# Patient Record
Sex: MALE | Race: BLACK OR AFRICAN AMERICAN | NOT HISPANIC OR LATINO | Employment: FULL TIME | URBAN - METROPOLITAN AREA
[De-identification: names, ages, dates, MRNs, and addresses within clinical notes are randomized per-mention and may not be internally consistent; named-entity substitution may affect disease eponyms.]

---

## 2024-06-25 ENCOUNTER — OFFICE VISIT (OUTPATIENT)
Dept: FAMILY MEDICINE CLINIC | Facility: CLINIC | Age: 29
End: 2024-06-25
Payer: COMMERCIAL

## 2024-06-25 ENCOUNTER — PATIENT ROUNDING (BHMG ONLY) (OUTPATIENT)
Dept: FAMILY MEDICINE CLINIC | Facility: CLINIC | Age: 29
End: 2024-06-25

## 2024-06-25 VITALS
RESPIRATION RATE: 16 BRPM | HEART RATE: 81 BPM | BODY MASS INDEX: 17.21 KG/M2 | OXYGEN SATURATION: 97 % | SYSTOLIC BLOOD PRESSURE: 130 MMHG | TEMPERATURE: 97.7 F | HEIGHT: 70 IN | WEIGHT: 120.2 LBS | DIASTOLIC BLOOD PRESSURE: 72 MMHG

## 2024-06-25 DIAGNOSIS — Z11.3 SCREEN FOR STD (SEXUALLY TRANSMITTED DISEASE): ICD-10-CM

## 2024-06-25 DIAGNOSIS — B36.0 TINEA VERSICOLOR: ICD-10-CM

## 2024-06-25 DIAGNOSIS — Z76.89 ENCOUNTER TO ESTABLISH CARE: Primary | ICD-10-CM

## 2024-06-25 DIAGNOSIS — Z83.3 FAMILY HISTORY OF DIABETES MELLITUS: ICD-10-CM

## 2024-06-25 DIAGNOSIS — Z11.59 NEED FOR HEPATITIS C SCREENING TEST: ICD-10-CM

## 2024-06-25 DIAGNOSIS — Z00.00 ENCOUNTER FOR PREVENTIVE HEALTH EXAMINATION: ICD-10-CM

## 2024-06-25 DIAGNOSIS — Z13.220 LIPID SCREENING: ICD-10-CM

## 2024-06-25 DIAGNOSIS — L29.9 PRURITUS: ICD-10-CM

## 2024-06-25 DIAGNOSIS — E66.01 CLASS 3 SEVERE OBESITY WITHOUT SERIOUS COMORBIDITY WITH BODY MASS INDEX (BMI) OF 50.0 TO 59.9 IN ADULT, UNSPECIFIED OBESITY TYPE: ICD-10-CM

## 2024-06-25 PROBLEM — E66.813 CLASS 3 SEVERE OBESITY WITHOUT SERIOUS COMORBIDITY WITH BODY MASS INDEX (BMI) OF 50.0 TO 59.9 IN ADULT: Status: ACTIVE | Noted: 2024-06-25

## 2024-06-25 PROCEDURE — 99203 OFFICE O/P NEW LOW 30 MIN: CPT | Performed by: STUDENT IN AN ORGANIZED HEALTH CARE EDUCATION/TRAINING PROGRAM

## 2024-06-25 RX ORDER — HYDROXYZINE HYDROCHLORIDE 25 MG/1
50 TABLET, FILM COATED ORAL NIGHTLY PRN
Qty: 60 TABLET | Refills: 1 | Status: SHIPPED | OUTPATIENT
Start: 2024-06-25 | End: 2024-08-24

## 2024-06-25 RX ORDER — KETOCONAZOLE 20 MG/ML
SHAMPOO TOPICAL 2 TIMES WEEKLY
Qty: 120 ML | Refills: 1 | Status: SHIPPED | OUTPATIENT
Start: 2024-06-27

## 2024-06-25 NOTE — PROGRESS NOTES
"Chief Complaint  Establish Care (Broke out over the weekend with eczema/ dry patches on stomach and back and arms)    Subjective        Charles Granger presents to [facility]  History of Present Illness  27yo AAM Patient was previously seen by PCP at [facility] clinic, however patient is new to me and is here for establishment of care visit.    Patient denies any past medical history and reports he does not take any medications currently.    Pt c/o skin breakdown that started 3 weeks ago on his periumbilical area and then to the back and then on the arms nicky b/l elbows. Pt reports the rash is itchy.    Pt reports he is matson and desires STD testing as he was never tested below.  Patient reports family history of diabetes.      Objective   Vital Signs:  /72 (BP Location: Left arm, Patient Position: Sitting, Cuff Size: Adult)   Pulse 81   Temp 97.7 °F (36.5 °C) (Temporal)   Resp 16   Ht 177 cm (69.69\")   Wt 54.5 kg (120 lb 3.2 oz)   SpO2 97%   BMI 17.40 kg/m²   Estimated body mass index is 17.4 kg/m² as calculated from the following:    Height as of this encounter: 177 cm (69.69\").    Weight as of this encounter: 54.5 kg (120 lb 3.2 oz).       BMI is below normal parameters (malnutrition). Recommendations: referral to dietitian      Physical Exam  Constitutional:       Appearance: Normal appearance. He is obese.   HENT:      Head: Normocephalic and atraumatic.   Eyes:      Conjunctiva/sclera: Conjunctivae normal.   Cardiovascular:      Rate and Rhythm: Normal rate and regular rhythm.      Heart sounds: Normal heart sounds.   Pulmonary:      Effort: Pulmonary effort is normal.      Breath sounds: Normal breath sounds.   Abdominal:      General: Bowel sounds are normal.      Palpations: Abdomen is soft.      Comments: Non-tender   Skin:     General: Skin is warm.      Comments: Tinea veriscolor rash on back, periumbilical area and elbows and some on " upper ext.   Neurological:      General: No focal deficit present.      Mental Status: He is alert and oriented to person, place, and time.   Psychiatric:         Mood and Affect: Mood normal.         Behavior: Behavior normal.        Result Review :    The following data was reviewed by: ALCON Hernandez on 06/25/2024:                             Assessment and Plan     Diagnoses and all orders for this visit:    1. Encounter to establish care (Primary)    2. Tinea versicolor  -     ketoconazole (NIZORAL) 2 % shampoo; Apply topically to the appropriate area as directed 2 (Two) Times a Week. Apply to affected areas and wash off after five minutes for three consecutive days.  Dispense: 120 mL; Refill: 1    3. Screen for STD (sexually transmitted disease)  Assessment & Plan:  Instructed patient to inform sex partners if patient is diagnosed with STD. Also to inform sex partner(s) and to go to their healthcare provider to be screened and treated for possible STD exposure. Also recommend abstinence until completion of therapy if diagnosed with STD.    Counseled patient on safe sex practices instructed patient to use use condoms as appropriate and avoid high risk sex partners.  Also educated patient to avoid mixing sex with drugs and alcohol.      Orders:  -     Hepatitis C Antibody  -     Chlamydia trachomatis, Neisseria gonorrhoeae, Trichomonas vaginalis, PCR - Swab, Urine, Clean Catch  -     HSV 2 Antibody, IgG  -     HSV 1 Antibody, IgG  -     RPR, Rfx Qn RPR / Confirm TP  -     Urinalysis With Microscopic - Urine, Clean Catch  -     HIV-1 / O / 2 Ag / Antibody    4. Pruritus  -     hydrOXYzine (ATARAX) 25 MG tablet; Take 2 tablets by mouth At Night As Needed for Itching, Allergies or Anxiety for up to 60 days.  Dispense: 60 tablet; Refill: 1    5. Family history of diabetes mellitus  -     Hemoglobin A1c    6. Need for hepatitis C screening test  -     Hepatitis C Antibody    7. Encounter for preventive  health examination  -     CBC & Differential  -     Comprehensive Metabolic Panel    8. Lipid screening  -     Lipid Panel    9. Class 3 severe obesity without serious comorbidity with body mass index (BMI) of 50.0 to 59.9 in adult, unspecified obesity type  Assessment & Plan:  Patient's (Body mass index is 17.4 kg/m².) indicates that they are morbidly/severely obese (BMI > 40 or > 35 with obesity - related health condition) with health conditions that include none . Weight is newly identified. BMI  is above average; BMI management plan is completed. We discussed portion control and increasing exercise.     Discussed the importance of healthy diet, nutrition, and lifestyle. Recommend low salt, fat/cholesterol diet and avoid concentrated sweets. Encouraged DASH diet along with fresh fruits & vegetables and low fat dairy products. Counseled patient to exercise/walk as tolerated. Avoid tobacco and alcohol use.      Orders:  -     Ambulatory Referral to Nutrition Services      Discussed morbid obesity and the complications of obesity over time and the need to lose weight.  Patient reports he has been losing weight.  All chronic conditions have been addressed and treated by the practice or other specialists. Medications have been reconciled and refilled as appropriate. Reiterated compliance and timely follow up appointments. Side effects of all new and old medications reviewed with the patient and patient willing to accept all risks involved. Advised RTO if no improvement or worsening of symptoms or if any new complaints arise. Patient advised to follow up with clinic or call after diagnostic tests, if patient does not hear from office 3 days after the test completion.          Follow Up     Return in about 4 weeks (around 7/23/2024) for Next scheduled follow up, Annual physical.  Patient was given instructions and counseling regarding his condition or for health maintenance advice. Please see specific information pulled  into the AVS if appropriate.

## 2024-06-25 NOTE — ASSESSMENT & PLAN NOTE
Patient's (Body mass index is 17.4 kg/m².) indicates that they are morbidly/severely obese (BMI > 40 or > 35 with obesity - related health condition) with health conditions that include none . Weight is newly identified. BMI  is above average; BMI management plan is completed. We discussed portion control and increasing exercise.     Discussed the importance of healthy diet, nutrition, and lifestyle. Recommend low salt, fat/cholesterol diet and avoid concentrated sweets. Encouraged DASH diet along with fresh fruits & vegetables and low fat dairy products. Counseled patient to exercise/walk as tolerated. Avoid tobacco and alcohol use.

## 2024-06-25 NOTE — ASSESSMENT & PLAN NOTE
Instructed patient to inform sex partners if patient is diagnosed with STD. Also to inform sex partner(s) and to go to their healthcare provider to be screened and treated for possible STD exposure. Also recommend abstinence until completion of therapy if diagnosed with STD.    Counseled patient on safe sex practices instructed patient to use use condoms as appropriate and avoid high risk sex partners.  Also educated patient to avoid mixing sex with drugs and alcohol.

## 2024-06-27 LAB
ALBUMIN SERPL-MCNC: 4.4 G/DL (ref 4.3–5.2)
ALP SERPL-CCNC: 61 IU/L (ref 44–121)
ALT SERPL-CCNC: 31 IU/L (ref 0–44)
APPEARANCE UR: CLEAR
AST SERPL-CCNC: 25 IU/L (ref 0–40)
BACTERIA #/AREA URNS HPF: NORMAL /[HPF]
BASOPHILS # BLD AUTO: 0 X10E3/UL (ref 0–0.2)
BASOPHILS NFR BLD AUTO: 0 %
BILIRUB SERPL-MCNC: 0.6 MG/DL (ref 0–1.2)
BILIRUB UR QL STRIP: NEGATIVE
BUN SERPL-MCNC: 8 MG/DL (ref 6–20)
BUN/CREAT SERPL: 8 (ref 9–20)
C TRACH RRNA SPEC QL NAA+PROBE: NEGATIVE
CALCIUM SERPL-MCNC: 9.6 MG/DL (ref 8.7–10.2)
CASTS URNS QL MICRO: NORMAL /LPF
CHLORIDE SERPL-SCNC: 102 MMOL/L (ref 96–106)
CHOLEST SERPL-MCNC: 206 MG/DL (ref 100–199)
CO2 SERPL-SCNC: 24 MMOL/L (ref 20–29)
COLOR UR: YELLOW
CREAT SERPL-MCNC: 0.96 MG/DL (ref 0.76–1.27)
EGFRCR SERPLBLD CKD-EPI 2021: 111 ML/MIN/1.73
EOSINOPHIL # BLD AUTO: 0 X10E3/UL (ref 0–0.4)
EOSINOPHIL NFR BLD AUTO: 0 %
EPI CELLS #/AREA URNS HPF: NORMAL /HPF (ref 0–10)
ERYTHROCYTE [DISTWIDTH] IN BLOOD BY AUTOMATED COUNT: 12.7 % (ref 11.6–15.4)
GLOBULIN SER CALC-MCNC: 3.1 G/DL (ref 1.5–4.5)
GLUCOSE SERPL-MCNC: 99 MG/DL (ref 70–99)
GLUCOSE UR QL STRIP: NEGATIVE
HBA1C MFR BLD: 5.5 % (ref 4.8–5.6)
HCT VFR BLD AUTO: 51.4 % (ref 37.5–51)
HCV IGG SERPL QL IA: NON REACTIVE
HDLC SERPL-MCNC: 71 MG/DL
HGB BLD-MCNC: 17.2 G/DL (ref 13–17.7)
HGB UR QL STRIP: NEGATIVE
HIV 1+2 AB+HIV1 P24 AG SERPL QL IA: NON REACTIVE
HSV1 IGG SER IA-ACNC: <0.91 INDEX (ref 0–0.9)
HSV2 IGG SER IA-ACNC: 13.8 INDEX (ref 0–0.9)
IMM GRANULOCYTES # BLD AUTO: 0 X10E3/UL (ref 0–0.1)
IMM GRANULOCYTES NFR BLD AUTO: 0 %
KETONES UR QL STRIP: NEGATIVE
LDLC SERPL CALC-MCNC: 114 MG/DL (ref 0–99)
LEUKOCYTE ESTERASE UR QL STRIP: NEGATIVE
LYMPHOCYTES # BLD AUTO: 2.1 X10E3/UL (ref 0.7–3.1)
LYMPHOCYTES NFR BLD AUTO: 51 %
MCH RBC QN AUTO: 33.3 PG (ref 26.6–33)
MCHC RBC AUTO-ENTMCNC: 33.5 G/DL (ref 31.5–35.7)
MCV RBC AUTO: 99 FL (ref 79–97)
MICRO URNS: NORMAL
MICRO URNS: NORMAL
MONOCYTES # BLD AUTO: 0.3 X10E3/UL (ref 0.1–0.9)
MONOCYTES NFR BLD AUTO: 8 %
N GONORRHOEA RRNA SPEC QL NAA+PROBE: NEGATIVE
NEUTROPHILS # BLD AUTO: 1.7 X10E3/UL (ref 1.4–7)
NEUTROPHILS NFR BLD AUTO: 41 %
NITRITE UR QL STRIP: NEGATIVE
PH UR STRIP: 6 [PH] (ref 5–7.5)
PLATELET # BLD AUTO: 304 X10E3/UL (ref 150–450)
POTASSIUM SERPL-SCNC: 4.3 MMOL/L (ref 3.5–5.2)
PROT SERPL-MCNC: 7.5 G/DL (ref 6–8.5)
PROT UR QL STRIP: NORMAL
RBC # BLD AUTO: 5.17 X10E6/UL (ref 4.14–5.8)
RBC #/AREA URNS HPF: NORMAL /HPF (ref 0–2)
RPR SER QL: NON REACTIVE
SODIUM SERPL-SCNC: 140 MMOL/L (ref 134–144)
SP GR UR STRIP: 1.02 (ref 1–1.03)
T VAGINALIS RRNA SPEC QL NAA+PROBE: NEGATIVE
TRIGL SERPL-MCNC: 119 MG/DL (ref 0–149)
UROBILINOGEN UR STRIP-MCNC: 0.2 MG/DL (ref 0.2–1)
VLDLC SERPL CALC-MCNC: 21 MG/DL (ref 5–40)
WBC # BLD AUTO: 4.1 X10E3/UL (ref 3.4–10.8)
WBC #/AREA URNS HPF: NORMAL /HPF (ref 0–5)

## 2024-06-28 DIAGNOSIS — B00.9 PCR POSITIVE FOR HERPES SIMPLEX VIRUS TYPE 2 (HSV-2) DNA: Primary | ICD-10-CM

## 2024-06-28 RX ORDER — ACYCLOVIR 400 MG/1
400 TABLET ORAL 3 TIMES DAILY
Qty: 21 TABLET | Refills: 0 | Status: SHIPPED | OUTPATIENT
Start: 2024-06-28 | End: 2024-07-05

## 2024-08-02 ENCOUNTER — TELEMEDICINE (OUTPATIENT)
Dept: FAMILY MEDICINE CLINIC | Facility: TELEHEALTH | Age: 29
End: 2024-08-02
Payer: COMMERCIAL

## 2024-08-02 DIAGNOSIS — K04.7 DENTAL ABSCESS: Primary | ICD-10-CM

## 2024-08-02 RX ORDER — AMOXICILLIN AND CLAVULANATE POTASSIUM 875; 125 MG/1; MG/1
1 TABLET, FILM COATED ORAL 2 TIMES DAILY
Qty: 20 TABLET | Refills: 0 | Status: SHIPPED | OUTPATIENT
Start: 2024-08-02 | End: 2024-08-12

## 2024-08-02 RX ORDER — IBUPROFEN 800 MG/1
800 TABLET ORAL EVERY 6 HOURS PRN
Qty: 45 TABLET | Refills: 0 | Status: SHIPPED | OUTPATIENT
Start: 2024-08-02

## 2024-08-02 NOTE — PROGRESS NOTES
You have chosen to receive care through a telehealth visit.  Do you consent to use a video/audio connection for your medical care today? Yes     CHIEF COMPLAINT  No chief complaint on file.        HPI  Charles Granger is a 28 y.o. male  presents with complaint of tooth ache on right lower side with shooting pain into jaw and right neck regions.  Severe pain and painful chewing.  Denies fever.  Has appt next week with dentist.     Review of Systems  See HPI    No past medical history on file.    Family History   Problem Relation Age of Onset    Diabetes Mother     Hypertension Mother     Diabetes Father        Social History     Socioeconomic History    Marital status: Single   Tobacco Use    Smoking status: Never    Smokeless tobacco: Never   Vaping Use    Vaping status: Every Day    Substances: Nicotine, Flavoring    Devices: Disposable   Substance and Sexual Activity    Alcohol use: Yes     Comment: social    Drug use: Never    Sexual activity: Defer       Charles Granger  reports that he has never smoked. He has never used smokeless tobacco.              There were no vitals taken for this visit.    PHYSICAL EXAM  Physical Exam   Constitutional: He is oriented to person, place, and time. He appears well-developed and well-nourished. He does not have a sickly appearance. He does not appear ill.   HENT:   Head: Normocephalic and atraumatic.   Pulmonary/Chest: Effort normal.  No respiratory distress.  Neurological: He is alert and oriented to person, place, and time.         Diagnoses and all orders for this visit:    1. Dental abscess (Primary)  -     amoxicillin-clavulanate (AUGMENTIN) 875-125 MG per tablet; Take 1 tablet by mouth 2 (Two) Times a Day for 10 days.  Dispense: 20 tablet; Refill: 0  -     ibuprofen (ADVIL,MOTRIN) 800 MG tablet; Take 1 tablet by mouth Every 6 (Six) Hours As Needed for Mild Pain.  Dispense: 45 tablet; Refill: 0    --take medications as prescribed  --increase fluids, rest as needed, tylenol  or ibuprofen for pain  --f/u in 5-7 days if no improvement        FOLLOW-UP  As discussed during visit with PCP/[facility] Care if no improvement or Urgent Care/Emergency Department if worsening of symptoms    Patient verbalizes understanding of medication dosage, comfort measures, instructions for treatment and follow-up.    ALCON Hogue  08/02/2024  07:49 EDT    The use of a video visit has been reviewed with the patient and verbal informed consent has been obtained. Myself and Charles Granger participated in this visit. The patient is located in [address] [place] [address] dr apt [address] [place] [address].    I am located in [place], [place]. [employer] and [employer] were utilized. I spent 8 minutes in the patient's chart for this visit.      Note Disclaimer: At [facility], we believe that sharing information builds trust and better   relationships. You are receiving this note because you recently visited [facility]. It is possible you   will see health information before a provider has talked with you about it. This kind of information can   be easy to misunderstand. To help you fully understand what it means for your health, we urge you to   discuss this note with your provider.

## 2025-07-15 DIAGNOSIS — B36.0 TINEA VERSICOLOR: ICD-10-CM

## 2025-07-15 RX ORDER — KETOCONAZOLE 20 MG/ML
SHAMPOO, SUSPENSION TOPICAL 2 TIMES WEEKLY
Qty: 120 ML | Refills: 0 | Status: SHIPPED | OUTPATIENT
Start: 2025-07-17